# Patient Record
Sex: FEMALE | Race: WHITE | NOT HISPANIC OR LATINO | Employment: PART TIME | URBAN - METROPOLITAN AREA
[De-identification: names, ages, dates, MRNs, and addresses within clinical notes are randomized per-mention and may not be internally consistent; named-entity substitution may affect disease eponyms.]

---

## 2017-09-22 PROBLEM — N81.6 RECTOCELE: Status: ACTIVE | Noted: 2017-09-22

## 2017-09-22 PROBLEM — R10.2 PELVIC PAIN IN FEMALE: Status: ACTIVE | Noted: 2017-09-22

## 2019-08-28 ENCOUNTER — TELEPHONE (OUTPATIENT)
Dept: VASCULAR SURGERY | Facility: CLINIC | Age: 44
End: 2019-08-28

## 2019-08-28 NOTE — TELEPHONE ENCOUNTER
----- Message from Emelyn Gupta sent at 8/28/2019  3:38 PM CDT -----     Pt  Is calling to  Make an alee  With  Dr  / pt  Has a  ct scan that  Shows a  Mass // ct scan  Was  Taken  6  Months // not a recent one // pt  Has a referral  From a  ENT // please call  For all details [phone]

## 2019-10-01 ENCOUNTER — TELEPHONE (OUTPATIENT)
Dept: VASCULAR SURGERY | Facility: CLINIC | Age: 44
End: 2019-10-01

## 2019-10-01 NOTE — TELEPHONE ENCOUNTER
----- Message from Sugar Choudhary sent at 10/1/2019 12:55 PM CDT -----  Contact: Patient   Patient would like a call back at [phone], Regards to getting her appointment reschedule.    Thanks  Td

## 2019-10-01 NOTE — TELEPHONE ENCOUNTER
----- Message from Sugar Choudhary sent at 10/1/2019 12:55 PM CDT -----  Contact: Patient   Patient would like a call back at [phone], Regards to getting her appointment reschedule.    Thanks  Td

## 2019-10-15 ENCOUNTER — OFFICE VISIT (OUTPATIENT)
Dept: CARDIAC SURGERY | Facility: CLINIC | Age: 44
End: 2019-10-15
Payer: MEDICAID

## 2019-10-15 VITALS
SYSTOLIC BLOOD PRESSURE: 111 MMHG | HEART RATE: 64 BPM | BODY MASS INDEX: 24.6 KG/M2 | WEIGHT: 147.63 LBS | HEIGHT: 65 IN | DIASTOLIC BLOOD PRESSURE: 70 MMHG

## 2019-10-15 DIAGNOSIS — E04.1 THYROID CYST: ICD-10-CM

## 2019-10-15 DIAGNOSIS — E32.8 THYMIC CYST: ICD-10-CM

## 2019-10-15 PROCEDURE — 99205 PR OFFICE/OUTPT VISIT, NEW, LEVL V, 60-74 MIN: ICD-10-PCS | Mod: S$PBB,,, | Performed by: THORACIC SURGERY (CARDIOTHORACIC VASCULAR SURGERY)

## 2019-10-15 PROCEDURE — 99205 OFFICE O/P NEW HI 60 MIN: CPT | Mod: S$PBB,,, | Performed by: THORACIC SURGERY (CARDIOTHORACIC VASCULAR SURGERY)

## 2019-10-15 PROCEDURE — 99213 OFFICE O/P EST LOW 20 MIN: CPT | Mod: PBBFAC,PO | Performed by: THORACIC SURGERY (CARDIOTHORACIC VASCULAR SURGERY)

## 2019-10-15 PROCEDURE — 99999 PR PBB SHADOW E&M-EST. PATIENT-LVL III: ICD-10-PCS | Mod: PBBFAC,,, | Performed by: THORACIC SURGERY (CARDIOTHORACIC VASCULAR SURGERY)

## 2019-10-15 PROCEDURE — 99999 PR PBB SHADOW E&M-EST. PATIENT-LVL III: CPT | Mod: PBBFAC,,, | Performed by: THORACIC SURGERY (CARDIOTHORACIC VASCULAR SURGERY)

## 2019-10-15 NOTE — LETTER
October 15, 2019      Nilay Payan MD  [address]  [address]  [place] [address]           [place] [facility]  [address] [facility] [address]  Phone: [phone]  Fax: [phone]          Patient: Nidhi Renteria   MR Number: [id number]   YOB: 1975   Date of Visit: 10/15/2019       Dear Dr. Nilay Payan:    Thank you for referring Nidhi Renteria to me for evaluation. Attached you will find relevant portions of my assessment and plan of care.    If you have questions, please do not hesitate to call me. I look forward to following Nidhi Renteria along with you.    Sincerely,    Sp Ogden MD    Enclosure  CC:  No Recipients    If you would like to receive this communication electronically, please contact [email or web address] or [phone] to request more information on [employer] Link access.    For providers and/or their staff who would like to refer a patient to [facility], please contact us through our one-stop-shop provider referral line, [facility] [place], at [phone].    If you feel you have received this communication in error or would no longer like to receive these types of communications, please e-mail [email or web address]

## 2019-10-15 NOTE — PROGRESS NOTES
CHIEF COMPLAINT:   Chief Complaint   Patient presents with    Mediastinal Mass     Ora/mediastinal mass       REFERRING PROVIDER: Nilay Payan MD    Reason for consult:  Evaluation of mediastinal abnormality    History of Present Illness     Patient is a 44 y.o. female who has been evaluated for a cyst in the left thyroid lobe.  During her evaluation in November 2018, she was found to have a cystic abnormality in the thymus location.  She denies myasthenic symptoms including muscle fatigue and ptosis.    Patient Active Problem List    Diagnosis Date Noted    Pelvic pain in female 09/22/2017    Rectocele 09/22/2017     Past Medical History:   Diagnosis Date    Pelvic pain in female       Past Surgical History:   Procedure Laterality Date    VAGINAL DELIVERY      x3      Medication List with Changes/Refills   Current Medications    IBUPROFEN (ADVIL,MOTRIN) 800 MG TABLET    Take 1 tablet (800 mg total) by mouth 3 (three) times daily.    OXYCODONE-ACETAMINOPHEN (PERCOCET) 5-325 MG PER TABLET    Take 1 tablet by mouth every 4 (four) hours as needed.     Review of patient's allergies indicates:  No Known Allergies   Social History     Tobacco Use    Smoking status: Current Every Day Smoker     Packs/day: 0.50     Types: Cigarettes    Smokeless tobacco: Never Used   Substance Use Topics    Alcohol use: Not Currently     Comment: occasionally      Family History   Problem Relation Age of Onset    Hypertension Mother     Hyperthyroidism Mother     No Known Problems Father       Review of Systems  General:  No fevers, chills, night sweats, weight changes, easy fatigability.  HEENT:  No visual field changes, blurred vision, double vision, ptosis.  Neck:  No complaints currently.  She did have some tenderness in the area of the left thyroid cyst, but this has resolved.  Respiratory:  No shortness of breath or hemoptysis.  Chest:  No pain.  Cardiac:  No angina, orthopnea, PND.  GI:  No melena.  :  No  "complaints.  Musculoskeletal:  No weakness, muscle fatigue, muscle cramps.  Vascular:  No claudication.  Neurologic:  No lateralizing complaints.    Objective: Physical Exam     Vitals:    10/15/19 1519   BP: 111/70   Pulse: 64   Weight: 67 kg (147 lb 9.6 oz)   Height: 5' 5" (1.651 m)   PainSc: 0-No pain       Objective    General:  Pleasant, well-nourished, well-developed lady in no obvious distress.  HEENT:  Normocephalic, atraumatic.  There is no ptosis.  Extraocular movements are intact.  Neck:  There is visual enlargement of the left lobe of the thyroid.  Carotids are 2+ without bruits.  Chest:  Lungs are clear bilaterally.  Heart:  Regular rate and rhythm.  Lymphatic:  No palpable supraclavicular lymphadenopathy.  Extremities:  No cyanosis, clubbing, edema.    Data Review:   Lab Results   Component Value Date    WBC 10.17 09/20/2017    HGB 13.5 09/20/2017    HCT 40.6 09/20/2017    MCV 96 09/20/2017     09/20/2017   ,   BMP No results found for: NA, K, CL, CO2, BUN, CREATININE, CALCIUM, ANIONGAP, ESTGFRAFRICA, EGFRNONAA,   CMP  No results found for: NA, K, CL, CO2, GLU, BUN, CREATININE, CALCIUM, PROT, ALBUMIN, BILITOT, ALKPHOS, AST, ALT, ANIONGAP, ESTGFRAFRICA, EGFRNONAA,   No results found for: INR, PROTIME    CT scan chest.  [facility]  CT CHEST W/WO CONTRAST    CLINICAL HISTORY: Localized swelling, mass and lump, trunk. Abnormal findings on diagnostic imaging of other specified body structures. Left thyroid mass. Anterior mediastinal mass.    COMPARISON: CT neck 11/8/2018.    TECHNIQUE:  Axial images pre and postcontrast. Post contrast Reformatted coronal and sagittal images.    CONTRAST: Isovue 370 contrast.    FINDINGS:  there is an enhancing left thyroid lobe mass measuring 1.6 x 2.1 cm corresponding to the finding on the neck CT scan from 11/8/2018.    Densities noted within the anterior mediastinal fat probably residual thymic tissue.     There is a anterior mediastinal mass with " smooth margins measuring 2.7 x 1.2 x 3.4 cm. There is a thin wall with no definite enhancing nodule. This is contiguous with proximal aortic arch and abuts the lateral margin of the superior mediastinum. On the   precontrast images, the density readings measuring between -30 and -7 Hounsfield units. On the postcontrast images, this measures between 10 Hounsfield units and 25 Hounsfield units. I'm uncertain if the postcontrast density readings are due to   enhancement or falsely elevated due to the proximity to the opacified aortic arch.    There is a contiguous nodule within the anterior mediastinum measuring 1.4 x 0.8 x 0.6 cm which has the same appearance as this larger mass.    Scattered mediastinal lymph nodes measuring up to approximate 6 mm in short axis diameter. There is a right hilar lymph node measuring approximately 8 mm in short axis diameter. There is minimal calcification within the mediastinum.    Minimal focal atelectasis posterior left lower lobe.    There is a nodule within the right pulmonary apex measuring 4 mm on axial image 16 series 2. Mild pleural prominence lateral posterior right hemithorax on axial images 69 and 70 series 2. There is a peripheral right lower lobe nodule measuring 5 mm on   axial image 78 series 2. Subtle small nodular opacity which is pleural-based measuring 3 mm left lower lobe axial image 81 series 2.    No definite acute or aggressive osseous lesion is identified.      IMPRESSION: Cystic anterior mediastinal mass as described above. This may be due to a thymic cyst. Older studies would be helpful for comparison if available. Otherwise recommend follow-up CT scan in the next 2-3 months. This can also be further   evaluated by MRI.    Few small scattered pulmonary nodules.    Enhancing mass involving the left thyroid lobe.    Please see above            All CT scans at [this location] are performed using dose modulation techniques as appropriate to a performed exam  including the following: automated exposure control; adjustment of the mA and/or kV according to patient size (this includes techniques or   standardized protocols for targeted exams where dose is matched to indication / reason for exam; i.e. extremities or head); use of iterative reconstruction technique.      Finalized on: 11/12/2018 1:18 PM By: Chaz Padilla MD   [facility]# [id number]      2018-11-12 13:20:42.040    [facility]   Other Result Information   Interface, Rad Results In - 11/12/2018  1:20 PM CST  EXAM:  CT CHEST W/WO CONTRAST    CLINICAL HISTORY: Localized swelling, mass and lump, trunk. Abnormal findings on diagnostic imaging of other specified body structures. Left thyroid mass. Anterior mediastinal mass.    COMPARISON: CT neck 11/8/2018.    TECHNIQUE:  Axial images pre and postcontrast. Post contrast Reformatted coronal and sagittal images.    CONTRAST: Isovue 370 contrast.    FINDINGS:  there is an enhancing left thyroid lobe mass measuring 1.6 x 2.1 cm corresponding to the finding on the neck CT scan from 11/8/2018.    Densities noted within the anterior mediastinal fat probably residual thymic tissue.     There is a anterior mediastinal mass with smooth margins measuring 2.7 x 1.2 x 3.4 cm. There is a thin wall with no definite enhancing nodule. This is contiguous with proximal aortic arch and abuts the lateral margin of the superior mediastinum. On the   precontrast images, the density readings measuring between -30 and -7 Hounsfield units. On the postcontrast images, this measures between 10 Hounsfield units and 25 Hounsfield units. I'm uncertain if the postcontrast density readings are due to   enhancement or falsely elevated due to the proximity to the opacified aortic arch.    There is a contiguous nodule within the anterior mediastinum measuring 1.4 x 0.8 x 0.6 cm which has the same appearance as this larger mass.    Scattered mediastinal lymph nodes measuring up to approximate 6 mm in short axis  diameter. There is a right hilar lymph node measuring approximately 8 mm in short axis diameter. There is minimal calcification within the mediastinum.    Minimal focal atelectasis posterior left lower lobe.    There is a nodule within the right pulmonary apex measuring 4 mm on axial image 16 series 2. Mild pleural prominence lateral posterior right hemithorax on axial images 69 and 70 series 2. There is a peripheral right lower lobe nodule measuring 5 mm on   axial image 78 series 2. Subtle small nodular opacity which is pleural-based measuring 3 mm left lower lobe axial image 81 series 2.    No definite acute or aggressive osseous lesion is identified.      IMPRESSION: Cystic anterior mediastinal mass as described above. This may be due to a thymic cyst. Older studies would be helpful for comparison if available. Otherwise recommend follow-up CT scan in the next 2-3 months. This can also be further   evaluated by MRI.    Few small scattered pulmonary nodules.    Enhancing mass involving the left thyroid lobe.    Please see above            All CT scans at [this location] are performed using dose modulation techniques as appropriate to a performed exam including the following: automated exposure control; adjustment of the mA and/or kV according to patient size (this includes techniques or   standardized protocols for targeted exams where dose is matched to indication / reason for exam; i.e. extremities or head); use of iterative reconstruction technique.      Finalized on: 11/12/2018 1:18 PM By: Chaz Padilla MD          Assessment:     1.  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  2.  Left thyroid cyst which is not related to the mediastinal abnormality.    Plan:     Plan MRI of the chest in order to more definitively evaluate the cystic abnormality in the location of the thymus.  Patient will follow with me after the MRI.

## 2019-11-13 ENCOUNTER — TELEPHONE (OUTPATIENT)
Dept: VASCULAR SURGERY | Facility: CLINIC | Age: 44
End: 2019-11-13

## 2019-11-13 NOTE — TELEPHONE ENCOUNTER
----- Message from Yael Izaguirre sent at 11/13/2019  9:45 AM CST -----  Contact: Deborah Calling from [facility] department [phone]  Calling to get order clarification on patients MRI.

## 2019-11-13 NOTE — TELEPHONE ENCOUNTER
----- Message from Ej Mendez sent at 11/13/2019  8:52 AM CST -----  Contact: Reema with [facility]  Type: Needs Medical Advice    Who Called:  Reema    John Call Back Number: [phone]  Additional Information: Needs to  a PA for the pt for a MRI of the chest with and without contrast.  Code:  30003  If the PA is already ready completed please fax to: [phone]  Needs PA by tomorrow 11/14/19 by 3:30pm or the pt will have to r/s

## 2021-09-21 ENCOUNTER — TELEPHONE (OUTPATIENT)
Dept: VASCULAR SURGERY | Facility: CLINIC | Age: 46
End: 2021-09-21

## 2021-10-11 ENCOUNTER — TELEPHONE (OUTPATIENT)
Dept: VASCULAR SURGERY | Facility: CLINIC | Age: 46
End: 2021-10-11

## 2021-10-12 ENCOUNTER — TELEPHONE (OUTPATIENT)
Dept: PODIATRY | Facility: CLINIC | Age: 46
End: 2021-10-12

## 2021-10-13 ENCOUNTER — OFFICE VISIT (OUTPATIENT)
Dept: PODIATRY | Facility: CLINIC | Age: 46
End: 2021-10-13
Payer: MEDICAID

## 2021-10-13 DIAGNOSIS — L60.0 INGROWN NAIL OF GREAT TOE OF RIGHT FOOT: Primary | ICD-10-CM

## 2021-10-13 PROCEDURE — 11750 EXCISION NAIL&NAIL MATRIX: CPT | Mod: T5,PBBFAC,PN | Performed by: PODIATRIST

## 2021-10-13 PROCEDURE — 11750 NAIL REMOVAL: ICD-10-PCS | Mod: T5,S$PBB,, | Performed by: PODIATRIST

## 2021-10-13 PROCEDURE — 99203 PR OFFICE/OUTPT VISIT, NEW, LEVL III, 30-44 MIN: ICD-10-PCS | Mod: 25,S$PBB,, | Performed by: PODIATRIST

## 2021-10-13 PROCEDURE — 99999 PR PBB SHADOW E&M-EST. PATIENT-LVL III: ICD-10-PCS | Mod: PBBFAC,,, | Performed by: PODIATRIST

## 2021-10-13 PROCEDURE — 99203 OFFICE O/P NEW LOW 30 MIN: CPT | Mod: 25,S$PBB,, | Performed by: PODIATRIST

## 2021-10-13 PROCEDURE — 99213 OFFICE O/P EST LOW 20 MIN: CPT | Mod: PBBFAC,PN | Performed by: PODIATRIST

## 2021-10-13 PROCEDURE — 99999 PR PBB SHADOW E&M-EST. PATIENT-LVL III: CPT | Mod: PBBFAC,,, | Performed by: PODIATRIST

## 2021-10-26 ENCOUNTER — TELEPHONE (OUTPATIENT)
Dept: VASCULAR SURGERY | Facility: CLINIC | Age: 46
End: 2021-10-26
Payer: MEDICAID

## 2021-10-26 ENCOUNTER — OFFICE VISIT (OUTPATIENT)
Dept: CARDIAC SURGERY | Facility: CLINIC | Age: 46
End: 2021-10-26
Payer: MEDICAID

## 2021-10-26 DIAGNOSIS — E32.8 THYMIC CYST: Primary | ICD-10-CM

## 2021-10-26 PROCEDURE — 99212 OFFICE O/P EST SF 10 MIN: CPT | Mod: 95,,, | Performed by: THORACIC SURGERY (CARDIOTHORACIC VASCULAR SURGERY)

## 2021-10-26 PROCEDURE — 99212 PR OFFICE/OUTPT VISIT, EST, LEVL II, 10-19 MIN: ICD-10-PCS | Mod: 95,,, | Performed by: THORACIC SURGERY (CARDIOTHORACIC VASCULAR SURGERY)

## 2022-11-14 DIAGNOSIS — E32.8 THYMIC CYST: Primary | ICD-10-CM
